# Patient Record
Sex: MALE | Race: WHITE
[De-identification: names, ages, dates, MRNs, and addresses within clinical notes are randomized per-mention and may not be internally consistent; named-entity substitution may affect disease eponyms.]

---

## 2022-01-10 ENCOUNTER — HOSPITAL ENCOUNTER (OUTPATIENT)
Dept: HOSPITAL 75 - ONC | Age: 77
LOS: 21 days | Discharge: HOME | End: 2022-01-31
Attending: RADIOLOGY
Payer: COMMERCIAL

## 2022-01-10 DIAGNOSIS — C61: Primary | ICD-10-CM

## 2022-01-10 LAB
BUN/CREAT SERPL: 14
CREAT SERPL-MCNC: 0.85 MG/DL (ref 0.6–1.3)
GFR SERPLBLD BASED ON 1.73 SQ M-ARVRAT: 88 ML/MIN

## 2022-01-10 PROCEDURE — 84520 ASSAY OF UREA NITROGEN: CPT

## 2022-01-10 PROCEDURE — 99204 OFFICE O/P NEW MOD 45 MIN: CPT

## 2022-01-10 PROCEDURE — 82565 ASSAY OF CREATININE: CPT

## 2022-01-10 PROCEDURE — 84153 ASSAY OF PSA TOTAL: CPT

## 2022-01-18 ENCOUNTER — HOSPITAL ENCOUNTER (OUTPATIENT)
Dept: HOSPITAL 75 - CARD | Age: 77
End: 2022-01-18
Attending: RADIOLOGY
Payer: COMMERCIAL

## 2022-01-18 DIAGNOSIS — C61: Primary | ICD-10-CM

## 2022-01-18 PROCEDURE — 78306 BONE IMAGING WHOLE BODY: CPT

## 2022-01-18 NOTE — DIAGNOSTIC IMAGING REPORT
INDICATION: Prostate cancer.



TECHNIQUE 26.3 mCi tech 99 MDP was given IV. Three hour delayed

whole body imaging. 



There is good uptake throughout the skeletal system. There is

focal increased uptake along the posterior cervical region on the

right at 2 levels. There is also increased uptake within the

right heel region. No other areas of abnormal uptake are seen

that would suggest metastatic disease.



IMPRESSION:

1. Uptake in the cervical region posteriorly likely degenerative

in nature.

2. Uptake along the calcaneus on the right most likely

posttraumatic. Clinical correlation. No films are available for

comparison.



Dictated by: 



  Dictated on workstation # [id number]